# Patient Record
Sex: MALE | Race: OTHER | Employment: STUDENT | URBAN - METROPOLITAN AREA
[De-identification: names, ages, dates, MRNs, and addresses within clinical notes are randomized per-mention and may not be internally consistent; named-entity substitution may affect disease eponyms.]

---
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
## 2019-09-25 ENCOUNTER — TELEPHONE (OUTPATIENT)
Dept: FAMILY MEDICINE CLINIC | Facility: CLINIC | Age: 19
End: 2019-09-25

## 2019-09-25 NOTE — TELEPHONE ENCOUNTER
Mother, states that patient has a sore throat and it feels swollen. Per mother the patient is in pain. Mother, states that the patient is away in school.

## 2019-09-26 NOTE — TELEPHONE ENCOUNTER
Left msg on family VM. Patient needs appt. If patient is away in school, he needs to be seen at school [facility] clinic or nearest walk in clinic. If patient is coming home, then please schedule him an appt.

## 2019-09-27 NOTE — TELEPHONE ENCOUNTER
Spoke with patient (verified name and ), states that he is feeling better, did not go to any clinic or IC, states that he just monitor and watch it and now better.

## 2019-10-11 ENCOUNTER — HOSPITAL ENCOUNTER (OUTPATIENT)
Age: 19
Discharge: HOME | End: 2019-10-11
Attending: EMERGENCY MEDICINE
Payer: COMMERCIAL

## 2019-10-11 VITALS
DIASTOLIC BLOOD PRESSURE: 73 MMHG | HEART RATE: 110 BPM | BODY MASS INDEX: 27.35 KG/M2 | WEIGHT: 220 LBS | HEIGHT: 75 IN | RESPIRATION RATE: 18 BRPM | SYSTOLIC BLOOD PRESSURE: 117 MMHG | OXYGEN SATURATION: 98 % | TEMPERATURE: 99 F

## 2019-10-11 DIAGNOSIS — B34.9 VIRAL SYNDROME: Primary | ICD-10-CM

## 2019-10-11 PROCEDURE — 87430 STREP A AG IA: CPT

## 2019-10-11 PROCEDURE — 99213 OFFICE O/P EST LOW 20 MIN: CPT

## 2019-10-11 PROCEDURE — 99204 OFFICE O/P NEW MOD 45 MIN: CPT

## 2019-10-11 RX ORDER — ONDANSETRON 4 MG/1
4 TABLET, FILM COATED ORAL EVERY 6 HOURS PRN
Qty: 4 TABLET | Refills: 0 | Status: SHIPPED | OUTPATIENT
Start: 2019-10-11

## 2019-10-11 NOTE — ED PROVIDER NOTES
Patient Seen in: [facility] [address] [place] [address]      History   Patient presents with:  Fever (infectious)    Stated Complaint: Flu Symptoms    HPI  Patient is here with sister and mother.   Patient is a college student and living in the dorm to person, place, and time. He appears well-developed and well-nourished. No distress. Well appearing   HENT:   Head: Normocephalic and atraumatic.    Right Ear: Tympanic membrane and external ear normal.   Left Ear: Tympanic membrane and external ear nor tomorrow. Requesting something for nausea. 4 tablets of Zofran were given. He was advised that he is contagious.       Disposition and Plan     Clinical Impression:  Viral syndrome  (primary encounter diagnosis)    Disposition:  Discharge  10/11/2019 10:

## 2019-10-11 NOTE — ED INITIAL ASSESSMENT (HPI)
For one week headaches nausea general aches and pains diarrhea fever while at school came home-home for . JUST STOPPED VAPING ONE WEEK AGO PER PT.

## 2023-03-09 ENCOUNTER — OFFICE VISIT (OUTPATIENT)
Dept: FAMILY MEDICINE CLINIC | Facility: CLINIC | Age: 23
End: 2023-03-09

## 2023-03-09 VITALS
WEIGHT: 275 LBS | BODY MASS INDEX: 35.29 KG/M2 | HEIGHT: 74 IN | SYSTOLIC BLOOD PRESSURE: 124 MMHG | TEMPERATURE: 97 F | HEART RATE: 67 BPM | DIASTOLIC BLOOD PRESSURE: 76 MMHG

## 2023-03-09 DIAGNOSIS — K92.1 BLOOD IN STOOL: ICD-10-CM

## 2023-03-09 DIAGNOSIS — R11.0 NAUSEA: ICD-10-CM

## 2023-03-09 DIAGNOSIS — R19.8 ALTERNATING CONSTIPATION AND DIARRHEA: ICD-10-CM

## 2023-03-09 DIAGNOSIS — D17.20 LIPOMA OF FOREARM: ICD-10-CM

## 2023-03-09 DIAGNOSIS — R14.0 POSTPRANDIAL ABDOMINAL BLOATING: Primary | ICD-10-CM

## 2023-03-09 PROCEDURE — 3078F DIAST BP <80 MM HG: CPT | Performed by: FAMILY MEDICINE

## 2023-03-09 PROCEDURE — 3074F SYST BP LT 130 MM HG: CPT | Performed by: FAMILY MEDICINE

## 2023-03-09 PROCEDURE — 99203 OFFICE O/P NEW LOW 30 MIN: CPT | Performed by: FAMILY MEDICINE

## 2023-03-09 PROCEDURE — 3008F BODY MASS INDEX DOCD: CPT | Performed by: FAMILY MEDICINE

## 2025-06-02 PROBLEM — M25.312 INSTABILITY OF LEFT SHOULDER JOINT: Status: RESOLVED | Noted: 2017-12-18 | Resolved: 2025-06-02

## 2025-06-02 PROBLEM — S43.002A SUBLUXATION OF LEFT SHOULDER JOINT: Status: RESOLVED | Noted: 2017-12-18 | Resolved: 2025-06-02

## 2025-06-02 NOTE — PROGRESS NOTES
Subjective:   Lavell Bustamante is a 25 year old male who presents for Derm Problem (Rash on face by lips,itchy, sometimes burn,  past 2 and half weeks, started dry, flaky on and off now its red , tried lotion)   Patient is a pleasant 25-year-old male with past medical history significant for postprandial bloating and hyperhidrosis.  Patient presents to office today new to this provider for evaluation of rash on face.  Patient states he has a rash on his right lower lip. It started about 3 weeks ago. It started off as a small patch of dry skin. It has since gotten bigger. It itches and flakes. No known bite. He has been applying lotions and oatmeal based lotions with no relief. Can crack and peel when opening mouth eating. Works as onsite manager for construction. Has pictures on phone with flaking, redness, hairline on face.         Past Medical History[1]   Past Surgical History[2]     History/Other:    Chief Complaint Reviewed and Verified  Nursing Notes Reviewed and   Verified  Tobacco Reviewed  Allergies Reviewed  Medications Reviewed    Problem List Reviewed  Medical History Reviewed  Surgical History   Reviewed  Family History Reviewed  Social History Reviewed         Tobacco:  He has never smoked tobacco.    Current Medications[3]      Review of Systems:  Review of Systems   Constitutional:  Negative for chills and fever.   Respiratory:  Negative for shortness of breath.    Cardiovascular:  Negative for chest pain.   Skin:  Positive for color change and rash.         Objective:   /77 (BP Location: Left arm, Patient Position: Sitting, Cuff Size: large)   Pulse 71   Ht 6' 2\" (1.88 m)   Wt 292 lb 9.6 oz (132.7 kg)   SpO2 97%   BMI 37.57 kg/m²  Estimated body mass index is 37.57 kg/m² as calculated from the following:    Height as of this encounter: 6' 2\" (1.88 m).    Weight as of this encounter: 292 lb 9.6 oz (132.7 kg).  Physical Exam  Vitals and nursing note reviewed.   Constitutional:        Appearance: Normal appearance. He is obese.   HENT:      Right Ear: External ear normal.      Left Ear: External ear normal.   Cardiovascular:      Rate and Rhythm: Normal rate and regular rhythm.      Pulses: Normal pulses.      Heart sounds: Normal heart sounds. No murmur heard.  Pulmonary:      Effort: Pulmonary effort is normal.      Breath sounds: Normal breath sounds.   Skin:     Capillary Refill: Capillary refill takes less than 2 seconds.      Findings: Erythema, lesion and rash present.             Comments: Lower lateral left lip and chin with peeling, mild excoriation, and erythema.  Right in hairline for goatee.   Neurological:      Mental Status: He is alert.           Assessment & Plan:   1. Rash (Primary)  -     Clotrimazole; Apply 1 Application topically 2 (two) times daily for 14 days.  Dispense: 60 g; Refill: 0  -     Hydrocortisone; Apply 0.5 g topically 2 (two) times daily for 7 days. Start after 5 days of clotrimazole if no improvement.  Dispense: 20 g; Refill: 0  -     Derm Referral - In Network  2. Seborrheic dermatitis  -     Clotrimazole; Apply 1 Application topically 2 (two) times daily for 14 days.  Dispense: 60 g; Refill: 0  -     Hydrocortisone; Apply 0.5 g topically 2 (two) times daily for 7 days. Start after 5 days of clotrimazole if no improvement.  Dispense: 20 g; Refill: 0  -     Derm Referral - In Network    Exam and complaint consistent with seborrheic dermatitis  Start clotrimazole cream twice daily and continue 5 days post resolution.  If no relief in symptoms start hydrocortisone 2.5 mg twice daily x 7 days at day 5.  If no resolution follow-up dermatology    Patient aware of plan of care. All questions answered to satisfaction of the patient. Patient instructed to call office or reach out via [employer] if any issues arise. For urgent issues and/or reviewed red flags please proceed to the urgent care or ER.  Also, inform the nurse practitioner with any new symptoms or  medication side effects.        Return for Annual physical.    Aden HelmROSA, 6/2/2025, 10:44 AM          [1]   Past Medical History:   Acute bronchitis    Acute otitis externa    Acute pharyngitis    Acute sinusitis    Acute tonsillitis    Conjunctivitis    Constipation    Disease of upper respiratory tract    Gastritis    Instability of left shoulder joint    Noninfectious gastroenteritis and colitis    Otitis media    Pain in soft tissues of limb    Pneumonia    Subluxation of left shoulder joint   [2] History reviewed. No pertinent surgical history.  [3]   Current Outpatient Medications   Medication Sig Dispense Refill    clotrimazole 1 % External Cream Apply 1 Application topically 2 (two) times daily for 14 days. 60 g 0    hydrocortisone 2.5 % External Ointment Apply 0.5 g topically 2 (two) times daily for 7 days. Start after 5 days of clotrimazole if no improvement. 20 g 0    Ondansetron HCl (ZOFRAN) 4 mg tablet Take 1 tablet (4 mg total) by mouth every 6 (six) hours as needed for Nausea. (Patient not taking: Reported on 6/3/2025) 4 tablet 0

## 2025-06-03 ENCOUNTER — OFFICE VISIT (OUTPATIENT)
Dept: FAMILY MEDICINE CLINIC | Facility: CLINIC | Age: 25
End: 2025-06-03
Payer: COMMERCIAL

## 2025-06-03 VITALS
DIASTOLIC BLOOD PRESSURE: 77 MMHG | WEIGHT: 292.63 LBS | OXYGEN SATURATION: 97 % | SYSTOLIC BLOOD PRESSURE: 120 MMHG | BODY MASS INDEX: 37.55 KG/M2 | HEIGHT: 74 IN | HEART RATE: 71 BPM

## 2025-06-03 DIAGNOSIS — L21.9 SEBORRHEIC DERMATITIS: ICD-10-CM

## 2025-06-03 DIAGNOSIS — R21 RASH: Primary | ICD-10-CM

## 2025-06-03 PROCEDURE — 99203 OFFICE O/P NEW LOW 30 MIN: CPT

## 2025-06-03 RX ORDER — CLOTRIMAZOLE 1 %
1 CREAM (GRAM) TOPICAL 2 TIMES DAILY
Qty: 60 G | Refills: 0 | Status: SHIPPED | OUTPATIENT
Start: 2025-06-03 | End: 2025-06-17

## 2025-06-03 RX ORDER — HYDROCORTISONE 25 MG/G
0.5 OINTMENT TOPICAL 2 TIMES DAILY
Qty: 20 G | Refills: 0 | Status: SHIPPED | OUTPATIENT
Start: 2025-06-03 | End: 2025-06-10

## (undated) NOTE — LETTER
Date & Time: 10/11/2019, 10:55 AM  Patient: Calvin Hernandez  Encounter Provider(s):    Xander Joiner MD       To Whom It May Concern:    Calvin Hernandez was seen and treated in our department on 10/11/2019.  He should not return to school until 10/14/201

## (undated) NOTE — LETTER
December 21, 2017     [facility]  [address]      Dear Lorraine Vasquez:    Below are the results from your recent visit: Strep culture was also negative.  Hopefully you are feeling better with regard to your tonsillitis    Result